# Patient Record
Sex: MALE | Race: WHITE | NOT HISPANIC OR LATINO | Employment: OTHER | URBAN - METROPOLITAN AREA
[De-identification: names, ages, dates, MRNs, and addresses within clinical notes are randomized per-mention and may not be internally consistent; named-entity substitution may affect disease eponyms.]

---

## 2018-07-12 ENCOUNTER — HOSPITAL ENCOUNTER (OUTPATIENT)
Dept: CARDIOLOGY | Facility: OTHER | Age: 43
Discharge: HOME | End: 2018-07-12
Attending: INTERNAL MEDICINE
Payer: COMMERCIAL

## 2018-07-12 ENCOUNTER — HOSPITAL ENCOUNTER (OUTPATIENT)
Dept: RADIOLOGY | Facility: OTHER | Age: 43
Discharge: HOME | End: 2018-07-12
Attending: INTERNAL MEDICINE
Payer: COMMERCIAL

## 2018-07-12 ENCOUNTER — PATIENT MESSAGE (OUTPATIENT)
Dept: INTERNAL MEDICINE | Facility: CLINIC | Age: 43
End: 2018-07-12

## 2018-07-12 ENCOUNTER — OFFICE VISIT (OUTPATIENT)
Dept: INTERNAL MEDICINE | Facility: CLINIC | Age: 43
End: 2018-07-12
Attending: INTERNAL MEDICINE
Payer: COMMERCIAL

## 2018-07-12 VITALS
WEIGHT: 170.63 LBS | BODY MASS INDEX: 26.78 KG/M2 | HEART RATE: 68 BPM | DIASTOLIC BLOOD PRESSURE: 80 MMHG | HEIGHT: 67 IN | SYSTOLIC BLOOD PRESSURE: 130 MMHG

## 2018-07-12 DIAGNOSIS — J30.9 ALLERGIC RHINITIS, UNSPECIFIED SEASONALITY, UNSPECIFIED TRIGGER: ICD-10-CM

## 2018-07-12 DIAGNOSIS — G89.29 CHRONIC LEFT SHOULDER PAIN: ICD-10-CM

## 2018-07-12 DIAGNOSIS — L72.3 SEBACEOUS CYST: ICD-10-CM

## 2018-07-12 DIAGNOSIS — Z80.42 FAMILY HISTORY OF PROSTATE CANCER: ICD-10-CM

## 2018-07-12 DIAGNOSIS — R01.1 HEART MURMUR: ICD-10-CM

## 2018-07-12 DIAGNOSIS — Z11.1 NORMAL SCREENING CHEST X-RAY FOR TUBERCULOSIS: Primary | ICD-10-CM

## 2018-07-12 DIAGNOSIS — M25.539 PAIN IN WRIST, UNSPECIFIED LATERALITY: ICD-10-CM

## 2018-07-12 DIAGNOSIS — Z00.00 ANNUAL PHYSICAL EXAM: Primary | ICD-10-CM

## 2018-07-12 DIAGNOSIS — M25.512 CHRONIC LEFT SHOULDER PAIN: ICD-10-CM

## 2018-07-12 DIAGNOSIS — Z80.51 FAMILY HISTORY OF CARCINOMA OF KIDNEY: ICD-10-CM

## 2018-07-12 DIAGNOSIS — Z11.3 SCREEN FOR STD (SEXUALLY TRANSMITTED DISEASE): ICD-10-CM

## 2018-07-12 LAB
BILIRUB UR QL STRIP: NEGATIVE
CLARITY UR: CLEAR
COLOR UR: YELLOW
GLUCOSE UR QL STRIP: NEGATIVE
HGB UR QL STRIP: NEGATIVE
KETONES UR QL STRIP: NEGATIVE
LEUKOCYTE ESTERASE UR QL STRIP: NEGATIVE
NITRITE UR QL STRIP: NEGATIVE
PH UR STRIP: 6 [PH] (ref 5–8)
PROT UR QL STRIP: NEGATIVE
SP GR UR STRIP: <=1.005 (ref 1–1.03)
URN SPEC COLLECT METH UR: ABNORMAL
UROBILINOGEN UR STRIP-ACNC: NEGATIVE EU/DL

## 2018-07-12 PROCEDURE — 99999 PR PBB SHADOW E&M-EST. PATIENT-LVL IV: CPT | Mod: PBBFAC,,, | Performed by: INTERNAL MEDICINE

## 2018-07-12 PROCEDURE — 73100 X-RAY EXAM OF WRIST: CPT | Mod: 26,RT,, | Performed by: RADIOLOGY

## 2018-07-12 PROCEDURE — 81003 URINALYSIS AUTO W/O SCOPE: CPT

## 2018-07-12 PROCEDURE — 99396 PREV VISIT EST AGE 40-64: CPT | Mod: S$GLB,,, | Performed by: INTERNAL MEDICINE

## 2018-07-12 PROCEDURE — 93306 TTE W/DOPPLER COMPLETE: CPT

## 2018-07-12 PROCEDURE — 73100 X-RAY EXAM OF WRIST: CPT | Mod: TC,FY,RT

## 2018-07-12 RX ORDER — MOMETASONE FUROATE 50 UG/1
2 SPRAY, METERED NASAL DAILY
Qty: 17 G | Refills: 5 | Status: SHIPPED | OUTPATIENT
Start: 2018-07-12 | End: 2018-07-20

## 2018-07-12 NOTE — PROGRESS NOTES
"Subjective:       Patient ID: Oneil Hand is a 42 y.o. male.    Chief Complaint: Annual Exam    Here for annual exam    Applying for [facility] department and needs complete physical, labs, and paperwork completed. Patient presents today for routine evaluation, physical, and labs. Patient has no major concerns or complaints today.     ROS:   + let shoulder pain and stiffness-saw orthopedist and told had calcium deposits. ROM improved with PT but has not been able to continue and symptoms have worsened some and he would like to resume this. Pain is less common symptoms and is sharp, non radiating and occurs with certain movements.    +Mild seasonal allergies controlled with OTC nasal steroid.  Right wrist pain with certain movements. Occurs after riding bike for long period of time. Pain at base of right thumb. No associated numbness/tingling of hand, weakness of , elbow pain.     +visual disturbance. Painless bilaterall visual disturbance described as "fractulated" / kaleidoscope -like change in vision. These symptoms will move within his vision. Symptoms last <30 minutes. No associated tearing, darkening or complete los of vision, HA, N/V, dizziness. Episode occurred one week prior and occurs once every several weeks.    +Father with hx of prostate CA in his mid 50s. He denies urinary frequency, urinary urgency, decreased force of stream, incomplete emptying of bladder, post void dribble, nocturia, or gross hematuria.     +Older brother passed at 42-42 of renal cancer.            Review of Systems   Constitutional: Negative for appetite change, chills, fever and unexpected weight change.   HENT: Negative for hearing loss, sore throat and trouble swallowing.    Eyes: Negative for visual disturbance.   Respiratory: Negative for cough, chest tightness and shortness of breath.    Cardiovascular: Negative for chest pain and leg swelling.   Gastrointestinal: Negative for abdominal pain, blood in stool, " "constipation, diarrhea, nausea and vomiting.   Endocrine: Negative for polydipsia and polyuria.   Genitourinary: Negative for decreased urine volume, difficulty urinating, dysuria, frequency and urgency.   Musculoskeletal: Negative for gait problem.   Skin: Positive for rash.   Neurological: Negative for dizziness and numbness.   Psychiatric/Behavioral: The patient is not nervous/anxious.        Objective:      Vitals:    07/12/18 0925   BP: 130/80   Pulse: 68   Weight: 77.4 kg (170 lb 10.2 oz)   Height: 5' 7" (1.702 m)      Physical Exam   Constitutional: He is oriented to person, place, and time. He appears well-developed and well-nourished. No distress.   HENT:   Head: Normocephalic and atraumatic.   Mouth/Throat: Oropharynx is clear and moist. No oropharyngeal exudate.   Eyes: Conjunctivae and EOM are normal. Pupils are equal, round, and reactive to light. No scleral icterus.   Neck: No thyromegaly present.   Cardiovascular: Normal rate and regular rhythm.    Murmur heard.  Pulmonary/Chest: Effort normal and breath sounds normal. He has no wheezes. He has no rales.   Abdominal: Soft. He exhibits no distension. There is no tenderness.   Musculoskeletal: He exhibits no edema or tenderness.        Right wrist: He exhibits normal range of motion, no tenderness, no bony tenderness, no swelling, no crepitus and no deformity.   Lymphadenopathy:     He has no cervical adenopathy.   Neurological: He is alert and oriented to person, place, and time. He has normal strength. No sensory deficit. Coordination and gait normal.   Skin: Skin is warm and dry.        Psychiatric: He has a normal mood and affect. His behavior is normal.       Assessment:       1. Annual physical exam    2. Chronic left shoulder pain    3. Sebaceous cyst    4. Family history of prostate cancer    5. Pain in wrist, unspecified laterality    6. Screen for STD (sexually transmitted disease)    7. Heart murmur    8. Family history of carcinoma of kidney "        Plan:       Oneil was seen today for annual exam.    Diagnoses and all orders for this visit:    Annual physical exam  -     Comprehensive metabolic panel; Future  -     Lipid panel; Future  -     TSH; Future  -     CBC auto differential; Future  -     Hemoglobin A1c; Future  -     URINALYSIS    Chronic left shoulder pain  -     Ambulatory Referral to Physical/Occupational Therapy    Sebaceous cyst  -     Ambulatory referral to General Surgery    Family history of prostate cancer  -     PSA, Screening; Future    Pain in wrist, unspecified laterality  -     X-Ray Wrist 2 View Right; Future    Screen for STD (sexually transmitted disease)  -     POCT TB Skin Test Read  -     Hepatitis B surface antigen; Future  -     RPR; Future  -     HIV-1 and HIV-2 antibodies; Future  -     Hepatitis C antibody; Future    Heart murmur  -asymptomatic  -     2D Echo w/ Color Flow Doppler; Future    Family history of carcinoma of kidney  -     US Kidney; Future    Allergic Rhinitis  -     mometasone (NASONEX) 50 mcg/actuation nasal spray; 2 sprays by Nasal route once daily.       RTC in 1 year or sooner prn           Side effects of medication(s) were discussed in detail and patient voiced understanding.  Patient will call back for any issues or complications.

## 2018-07-13 DIAGNOSIS — I34.1 MITRAL VALVE PROLAPSE: Primary | ICD-10-CM

## 2018-07-13 LAB
DIASTOLIC DYSFUNCTION: YES
ESTIMATED PA SYSTOLIC PRESSURE: 12.96
MITRAL VALVE REGURGITATION: ABNORMAL
RETIRED EF AND QEF - SEE NOTES: 55 (ref 55–65)
TRICUSPID VALVE REGURGITATION: ABNORMAL

## 2018-07-13 NOTE — TELEPHONE ENCOUNTER
Dr. Vyas, Mr. Sneed is requesting a chest xray. He has a nurse visit for TB skin test on 07/18/2018. Should we wait to schedule after his test. Please advise.

## 2018-07-18 ENCOUNTER — HOSPITAL ENCOUNTER (OUTPATIENT)
Dept: RADIOLOGY | Facility: OTHER | Age: 43
Discharge: HOME | End: 2018-07-18
Attending: INTERNAL MEDICINE
Payer: COMMERCIAL

## 2018-07-18 ENCOUNTER — CLINICAL SUPPORT (OUTPATIENT)
Dept: INTERNAL MEDICINE | Facility: CLINIC | Age: 43
End: 2018-07-18
Payer: COMMERCIAL

## 2018-07-18 DIAGNOSIS — Z80.51 FAMILY HISTORY OF CARCINOMA OF KIDNEY: ICD-10-CM

## 2018-07-18 DIAGNOSIS — Z11.1 NORMAL SCREENING CHEST X-RAY FOR TUBERCULOSIS: ICD-10-CM

## 2018-07-18 PROCEDURE — 71046 X-RAY EXAM CHEST 2 VIEWS: CPT | Mod: 26,,, | Performed by: RADIOLOGY

## 2018-07-18 PROCEDURE — 86580 TB INTRADERMAL TEST: CPT | Mod: S$GLB,,, | Performed by: INTERNAL MEDICINE

## 2018-07-18 PROCEDURE — 99999 PR PBB SHADOW E&M-EST. PATIENT-LVL I: CPT | Mod: PBBFAC,,,

## 2018-07-18 PROCEDURE — 76770 US EXAM ABDO BACK WALL COMP: CPT | Mod: 26,,, | Performed by: RADIOLOGY

## 2018-07-18 PROCEDURE — 71046 X-RAY EXAM CHEST 2 VIEWS: CPT | Mod: TC,FY

## 2018-07-18 PROCEDURE — 76770 US EXAM ABDO BACK WALL COMP: CPT | Mod: TC

## 2018-07-18 NOTE — PROGRESS NOTES
PPD 0.1 ml given ID into left forearm, inner aspect. 1 mm bleb noted at injection site, marked with skin marker. Instructed not to wash the skin marker off and to return to clinic in 48-72 hours to read results. Pt advised if PPD is not read within 48-72 hours, repeat is required. Pt instructed to wait in reception area for 15-20 mins to monitor for any adverse response.     Have pt travel to a foreign country: no.    Have pt had any history of positive PPD: no    Pt verbalized understanding and has no further questions and/or concerns at this time.

## 2018-07-20 ENCOUNTER — CLINICAL SUPPORT (OUTPATIENT)
Dept: INTERNAL MEDICINE | Facility: CLINIC | Age: 43
End: 2018-07-20
Payer: COMMERCIAL

## 2018-07-20 ENCOUNTER — INITIAL CONSULT (OUTPATIENT)
Dept: CARDIOLOGY | Facility: CLINIC | Age: 43
End: 2018-07-20
Attending: INTERNAL MEDICINE
Payer: COMMERCIAL

## 2018-07-20 VITALS
HEIGHT: 67 IN | SYSTOLIC BLOOD PRESSURE: 120 MMHG | BODY MASS INDEX: 27 KG/M2 | HEART RATE: 61 BPM | DIASTOLIC BLOOD PRESSURE: 71 MMHG | WEIGHT: 172 LBS

## 2018-07-20 DIAGNOSIS — I34.1 MITRAL VALVE PROLAPSE: Primary | ICD-10-CM

## 2018-07-20 DIAGNOSIS — I34.0 NON-RHEUMATIC MITRAL REGURGITATION: ICD-10-CM

## 2018-07-20 LAB
TB INDURATION - 48 HR READ: 0 MM
TB INDURATION - 72 HR READ: NORMAL MM
TB SKIN TEST - 48 HR READ: NEGATIVE
TB SKIN TEST - 72 HR READ: NORMAL

## 2018-07-20 PROCEDURE — 99204 OFFICE O/P NEW MOD 45 MIN: CPT | Mod: S$GLB,,, | Performed by: INTERNAL MEDICINE

## 2018-07-20 PROCEDURE — 93000 ELECTROCARDIOGRAM COMPLETE: CPT | Mod: S$GLB,,, | Performed by: INTERNAL MEDICINE

## 2018-07-20 NOTE — PROGRESS NOTES
Pt came into the clinic for TB skin read. Pt left arm is clear, free of redness or swelling. TB skin test is negative with 0mm induration. Results have been documented per clinic policy.

## 2018-07-20 NOTE — PROGRESS NOTES
"  Subjective:      Patient ID: Oneil Hand is a 42 y.o. male.    Chief Complaint: Heart Murmur (Ref by Dr Vyas)    HPI:  Pt referred by Dr Vyas.  Pt was recently diagnosed with a heart murmur and was found to have mitral valve prolapse with moderate mitral insufficiency and a dilated left atrium on echocardiogram.  Pt runs 3 to 5 miles 2 or 3 days a week and bikes 20 to 40 miles twice a week. Pt used to rum 50 mile [employer] up until a few years ago.   There is no hx of chest pain or shortness of breath with exertion.  There is no hx palpitations or syncope.      Pt does feel the occasional urge to take a deep breath while at rest.   Pt does occasionally feel a "runner's stitch' type of cramp when running    Review of Systems   Cardiovascular: Negative for chest pain, claudication, dyspnea on exertion, irregular heartbeat, leg swelling, near-syncope, orthopnea, palpitations and syncope.      Pt works for [employer] in [place].    Past Medical History:   Diagnosis Date    Heart murmur         Past Surgical History:   Procedure Laterality Date    ANTERIOR CRUCIATE LIGAMENT REPAIR         Family History   Problem Relation Age of Onset    Heart disease Father     Cancer Father         prostate cancer    Heart attack Father     Cancer Brother         renal cancer    Stroke Maternal Grandmother     Cancer Paternal Grandmother         Esophageal       Social History     Social History    Marital status:      Spouse name: N/A    Number of children: 0    Years of education: N/A     Occupational History    Landscape      Social History Main Topics    Smoking status: Former Smoker     Types: Cigarettes     Quit date: 2016    Smokeless tobacco: Former User     Types: Chew      Comment: social, about once/month    Alcohol use Yes    Drug use: No    Sexual activity: Not Asked     Other Topics Concern    None     Social History Narrative    None       Current Outpatient " "Prescriptions on File Prior to Visit   Medication Sig Dispense Refill    [DISCONTINUED] mometasone (NASONEX) 50 mcg/actuation nasal spray 2 sprays by Nasal route once daily. 17 g 5     No current facility-administered medications on file prior to visit.        Review of patient's allergies indicates:  No Known Allergies  Objective:     Vitals:    07/20/18 1323   BP: 120/71   BP Location: Left arm   Patient Position: Sitting   BP Method: Medium (Automatic)   Pulse: 61   Weight: 78 kg (172 lb)   Height: 5' 7" (1.702 m)        Physical Exam   Constitutional: He is oriented to person, place, and time. He appears well-developed and well-nourished. No distress.   Eyes: No scleral icterus.   Neck: No JVD present. Carotid bruit is not present.   Cardiovascular: Regular rhythm.  Exam reveals no gallop and no friction rub.    Murmur (midsystolic click with late systolic III/VI systolic murmur) heard.  Pulmonary/Chest: Effort normal and breath sounds normal. No respiratory distress.   Musculoskeletal: He exhibits no edema.   Neurological: He is alert and oriented to person, place, and time.   Skin: Skin is warm and dry. He is not diaphoretic.   Psychiatric: He has a normal mood and affect. His behavior is normal. Judgment and thought content normal.   Vitals reviewed.     ECG: sinus bradycardia, possible LVH, otherwise WNL    Recent lab OK except   Assessment:     1. Mitral valve prolapse    2. Non-rheumatic mitral regurgitation      Plan:   Oneil was seen today for heart murmur.    Diagnoses and all orders for this visit:    Mitral valve prolapse  -     EKG 12-lead    Non-rheumatic mitral regurgitation     Long discussion with pt regarding diagnosis of congenital mitral valve prolapse and potential need for mitral valve repair or replacement in the future.      Discussed how late-systolic mitral regurgitation is often well tolerated (as opposed to holosytolic)    Pt knows to report any symptoms of progressive " shortness of breath with exertion.    No need for antibiotic prophylaxis.     Mediterranean diet discussed    Repeat lipid profile in future after pt follows a more prudent diet before deciding on therapy with a statin.     Pt may exercise without restriction.     Pt may be stationed without restriction in any position in any [place] [employer]     Follow-up in about 6 months (around 1/20/2019).

## 2018-07-20 NOTE — LETTER
July 20, 2018      Faustino Vyas MD  [address]  [address]  [facility] [address]           [facility]  [address], [address]  [facility] [address]  Phone: [phone]  Fax: [phone]          Patient: Oneil Hand   MR Number: [id number]   YOB: 1975   Date of Visit: 7/20/2018       Dear Dr. Faustino Vyas:    Thank you for referring Oneil Hand to me for evaluation. Attached you will find relevant portions of my assessment and plan of care.    If you have questions, please do not hesitate to call me. I look forward to following Oneil Hand along with you.    Sincerely,    Canelo Engle MD    Enclosure  CC:  No Recipients    If you would like to receive this communication electronically, please contact [email or web address] or [phone] to request more information on [employer] access.    For providers and/or their staff who would like to refer a patient to [facility], please contact us through our one-stop-shop provider referral line, [facility], at [phone].    If you feel you have received this communication in error or would no longer like to receive these types of communications, please e-mail [email or web address]

## 2018-07-24 ENCOUNTER — PATIENT MESSAGE (OUTPATIENT)
Dept: INTERNAL MEDICINE | Facility: CLINIC | Age: 43
End: 2018-07-24

## 2018-07-24 NOTE — TELEPHONE ENCOUNTER
Pt would like a name for an general surgeon in [place] the referral is already an external she would just like a name

## 2018-08-15 ENCOUNTER — PATIENT MESSAGE (OUTPATIENT)
Dept: INTERNAL MEDICINE | Facility: CLINIC | Age: 43
End: 2018-08-15

## 2018-08-15 DIAGNOSIS — G89.29 CHRONIC SHOULDER PAIN, UNSPECIFIED LATERALITY: Primary | ICD-10-CM

## 2018-08-15 DIAGNOSIS — L72.3 SEBACEOUS CYST: ICD-10-CM

## 2018-08-15 DIAGNOSIS — M25.519 CHRONIC SHOULDER PAIN, UNSPECIFIED LATERALITY: Primary | ICD-10-CM

## 2018-08-15 NOTE — TELEPHONE ENCOUNTER
From pt  Shanna,     Below are the fax numbers.       [facility] General Surgery: [phone]     [facility] Physical Therapy: [phone]       Thank you,   Oneil

## 2018-10-01 ENCOUNTER — PATIENT MESSAGE (OUTPATIENT)
Dept: CARDIOLOGY | Facility: CLINIC | Age: 43
End: 2018-10-01

## 2019-03-26 RX ORDER — MOMETASONE FUROATE 50 UG/1
SPRAY, METERED NASAL
Qty: 17 G | Refills: 5 | Status: SHIPPED | OUTPATIENT
Start: 2019-03-26

## 2021-01-05 ENCOUNTER — PATIENT MESSAGE (OUTPATIENT)
Dept: ADMINISTRATIVE | Facility: HOSPITAL | Age: 46
End: 2021-01-05

## 2021-04-05 ENCOUNTER — PATIENT MESSAGE (OUTPATIENT)
Dept: ADMINISTRATIVE | Facility: HOSPITAL | Age: 46
End: 2021-04-05